# Patient Record
Sex: FEMALE | Race: WHITE
[De-identification: names, ages, dates, MRNs, and addresses within clinical notes are randomized per-mention and may not be internally consistent; named-entity substitution may affect disease eponyms.]

---

## 2017-02-27 ENCOUNTER — HOSPITAL ENCOUNTER (EMERGENCY)
Dept: HOSPITAL 80 - CED | Age: 52
Discharge: HOME | End: 2017-02-27
Payer: COMMERCIAL

## 2017-02-27 VITALS
OXYGEN SATURATION: 97 % | DIASTOLIC BLOOD PRESSURE: 89 MMHG | SYSTOLIC BLOOD PRESSURE: 130 MMHG | RESPIRATION RATE: 16 BRPM | HEART RATE: 79 BPM

## 2017-02-27 DIAGNOSIS — G35: ICD-10-CM

## 2017-02-27 DIAGNOSIS — R07.89: Primary | ICD-10-CM

## 2017-02-27 LAB
% IMMATURE GRANULYOCYTES: 0.2 % (ref 0–1.1)
ABSOLUTE IMMATURE GRANULOCYTES: 0.01 10^3/UL (ref 0–0.1)
ABSOLUTE NRBC COUNT: 0 10^3/UL (ref 0–0.01)
ADD DIFF?: NO
ADD MORPH?: NO
ADD SCAN?: NO
ANION GAP SERPL CALC-SCNC: 12 MEQ/L (ref 8–16)
APTT BLD: 26.4 SEC (ref 23–38)
ATYPICAL LYMPHOCYTE FLAG: 0 (ref 0–99)
CALCIUM SERPL-MCNC: 9.7 MG/DL (ref 8.5–10.4)
CHLORIDE SERPL-SCNC: 101 MEQ/L (ref 97–110)
CO2 SERPL-SCNC: 26 MEQ/L (ref 22–31)
CREAT SERPL-MCNC: 0.8 MG/DL (ref 0.6–1)
ERYTHROCYTE [DISTWIDTH] IN BLOOD BY AUTOMATED COUNT: 11.9 % (ref 11.5–15.2)
FRAGMENT RBC FLAG: 0 (ref 0–99)
GFR SERPL CREATININE-BSD FRML MDRD: > 60 ML/MIN/{1.73_M2}
GLUCOSE SERPL-MCNC: 94 MG/DL (ref 70–100)
HCT VFR BLD CALC: 43.7 % (ref 38–47)
HGB BLD-MCNC: 15.1 G/DL (ref 12.6–16.3)
INR PPP: 0.97 (ref 0.83–1.16)
LEFT SHIFT FLG: 0 (ref 0–99)
LIPEMIA HEMOLYSIS FLAG: 90 (ref 0–99)
MCH RBC BLDCO QN: 31.8 PG (ref 27.9–34.1)
MCHC RBC AUTO-ENTMCNC: 34.6 G/DL (ref 32.4–36.7)
MCV RBC AUTO: 92 FL (ref 81.5–99.8)
NRBC-AUTO%: 0 % (ref 0–0.2)
PLATELET # BLD: 183 10^3/UL (ref 150–400)
PLATELET CLUMPS FLAG: 0 (ref 0–99)
PMV BLD AUTO: 10.5 FL (ref 8.7–11.7)
POTASSIUM SERPL-SCNC: 4 MEQ/L (ref 3.5–5.2)
PROTHROMBIN TIME: 12.6 SEC (ref 12–15)
RBC # BLD AUTO: 4.75 10^6/UL (ref 4.18–5.33)
SODIUM SERPL-SCNC: 139 MEQ/L (ref 134–144)
TROPONIN I SERPL-MCNC: < 0.012 NG/ML (ref 0–0.03)

## 2017-02-27 NOTE — UCPHY
H & P


Time Seen by Provider: 02/27/17 19:01


Patient Type: Established


HPI/ROS: 





HPI


Chest discomfort.





51-year-old female by private vehicle with her .  She reports that she 

has had on and off chest discomfort which she describes as a tightness, 

pressure and mild aching sensation in the mid chest with radiation to the back.

  She reports onset of this was yesterday.  It is not worse with exertion or at 

rest.  She reports that comes on intermittently.  She denies any significant 

shortness of breath.  No cough.  No fever.  She has no family history of 

coronary artery disease.  She has a history of MS.  She is on some medications 

for this.  Otherwise no history of hypertension, diabetes, hyperlipidemia.  She 

is a nonsmoker.





ROS:





Constitutional:  No fever, no chills.  No weakness.


Eyes:  No discharge.  No changes in vision.


ENT:  No sore throat.  No nasal congestion or rhinorrhea.


Respiratory:  No cough.  No shortness of breath.


Cardiac:  As above, no palpitations.


Gastrointestinal:  No abdominal pain, no vomiting, no diarrhea.


Genitourinary:  No hematuria.  No dysuria or increased frequency with urination.


Musculoskeletal:  No back pain.  No neck pain.  No myalgias or arthralgias.


Skin:  No rashes.


Neurological:  No headache.  No focal weakness or altered sensation.





Past medical history:  As above.





Social history:  Here with her .  Nonsmoker.





Physical Exam:





General Appearance:  Alert, no distress.  This patient is responding to 

questions appropriately and in full sentences.  This patient appears well-

hydrated and well-nourished.


Eyes:  Pupils equal and round no pallor or injection.  No lid edema, erythema 

or injection.


Respiratory:  There are no retractions, lungs are clear to auscultation with 

good air movement bilaterally.


Cardiovascular:  Regular rate and rhythm.  No murmur.  No tenderness on 

palpation of the chest wall.


Gastrointestinal:  Abdomen is soft and nontender, no masses, bowel sounds 

normal.  No focal tenderness at McBurney's point.  No Rojas sign.


Neurological:  Motor sensory function is grossly intact.  Cranial nerves are 

normal.  Gait is normal.


Skin:  Warm and dry, no rashes.


Musculoskeletal:  Neck is supple and nontender.


Extremities are symmetrical.  All joints range without pain or impingement.


Psychiatric:  No agitation.  No depression.





Database:





EKG:





EKG time is 7:13 p.m.; EKG shows a narrow complex normal sinus rhythm with a 

ventricular rate of 82.  The OK, QRS, QT intervals are within normal limits.  

There are no ST-T wave changes indicative of ischemic or injury pattern.  No 

evidence of right heart strain.  Interpreted by me.





Imaging:





Chest x-ray PA and lateral; the cardiac mediastinal silhouette is unremarkable.

  No evidence of infiltrate or pneumothorax.  No acute cardiopulmonary disease 

process noted.  Interpreted by me.





Procedures:





Emergency department course:





IV placed.  She was placed on a monitor.  EKG and chest x-ray performed.  She 

was given 324 mg of chewed aspirin.  She was given 0.5 mg of IV Ativan.





8:10 p.m., patient re-evaluated.  She is resting comfortably at this time.  She 

denies any complaints at this time.  No chest discomfort.  No shortness of 

breath. Her Urgent Care evaluation is reassuring.  Her presentation is not 

consistent with acute coronary syndrome or pulmonary embolism.  She thinks that 

the Ativan helped her.  Results of her diagnostic workup were discussed with 

her and her .  I discussed admission.  She does not want to be admitted.

  She is requesting discharge.  She feels comfortable going home.  I will have 

her follow up with [facility] for stress testing and re-evaluation within 

the next 3 days.  Return to emergency department precautions of thoroughly been 

reviewed with her.  All of her questions were answered.  She was discharged in 

good condition.





Differential Diagnosis:





The differential diagnosis on this patient includes but is not limited to 

anxiety reaction, stress reaction, esophageal spasm, pleurisy, reactive airway 

disease.  Acute coronary syndrome, pulmonary embolism, pericarditis, myocarditis

, pneumonia, pneumothorax, AAA unlikely.  This represents a partial list of 

diagnoses considered.  These considerations are based on history, physical exam

, past history, reassessment and diagnostic testing.


Constitutional: 


 Initial Vital Signs











Heart Rate  79   02/27/17 20:50


 


Respiratory Rate  16   02/27/17 20:50


 


Blood Pressure  130/89 H  02/27/17 20:50


 


O2 Sat (%)  97   02/27/17 20:50








 











O2 Delivery Mode               Room Air














Allergies/Adverse Reactions: 


 





No Known Allergies Allergy (Unverified 08/23/12 14:05)


 








Home Medications: 














 Medication  Instructions  Recorded


 


Estrogen,Con/M-Progest Acet  02/27/17














Medical Decision Making





- Data Points


Laboratory Results: 


 Laboratory Results





 02/27/17 19:21 





 02/27/17 19:21 








Medications Given: 


 








Discontinued Medications





Aspirin (Aspirin)  324 mg PO EDNOW ONE


   Stop: 02/27/17 19:28


   Last Admin: 02/27/17 20:00 Dose:  324 mg


Sodium Chloride (Ns)  500 mls @ 0 mls/hr IV ONCE ONE


   PRN Reason: As Directed


   Stop: 02/27/17 19:28


   Last Admin: 02/27/17 19:58 Dose:  500 mls


Lorazepam (Ativan Injection)  0.5 mg IVP EDNOW ONE


   Stop: 02/27/17 19:28


   Last Admin: 02/27/17 20:01 Dose:  0.5 mg


Lorazepam (Ativan 1 Mg Prepack#4)  1 btl TAKEHOME EDNOW ONE


   Stop: 02/27/17 20:19


   Last Admin: 02/27/17 20:50 Dose:  1 btl








Departure





- Departure


Disposition: Home, Routine, Self-Care


Clinical Impression: 


 Chest discomfort





Condition: Good


Instructions:  Chest Pain (ED)


Additional Instructions: 


Read and follow provided instructions.





Ativan, 1 mg tablets:  1 tablet at night to help you sleep and relieve anxiety.





Follow-up with with Dr. Jordan Avery or 1 of his partners were assistance at 

[facility] for a stress test as discussed within the next 2-3 days.





Return to the emergency department immediately for worsening chest pain, 

shortness of breath or other serious concerns.


Referrals: 


Blanca Manzanares MD [Primary Care Provider] - As per Instructions


Jordan Avery MD [Medical Doctor] - As per Instructions





- PQRS


PQRS Measurement: 





Not applicable.

## 2017-02-27 NOTE — CPEKG
Heart Rate: 82

RR Interval: 732

P-R Interval: 156

QRSD Interval: 78

QT Interval: 376

QTC Interval: 439

P Axis: 71

QRS Axis: 56

T Wave Axis: 41

EKG Severity - BORDERLINE ECG -

EKG Impression: SINUS RHYTHM

EKG Impression: PROBABLE LEFT ATRIAL ABNORMALITY

Electronically Signed By: McCollester, Laughlin 27-Feb-2017 20:44:42

## 2017-04-06 ENCOUNTER — HOSPITAL ENCOUNTER (EMERGENCY)
Dept: HOSPITAL 80 - CED | Age: 52
Discharge: HOME | End: 2017-04-06
Payer: COMMERCIAL

## 2017-04-06 VITALS
RESPIRATION RATE: 18 BRPM | SYSTOLIC BLOOD PRESSURE: 133 MMHG | HEART RATE: 90 BPM | OXYGEN SATURATION: 98 % | DIASTOLIC BLOOD PRESSURE: 93 MMHG | TEMPERATURE: 97.9 F
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
## 2017-04-06 DIAGNOSIS — S50.912A: ICD-10-CM

## 2017-04-06 DIAGNOSIS — Z23: ICD-10-CM
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
## 2017-04-06 DIAGNOSIS — S61.230A: Primary | ICD-10-CM
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
## 2017-04-06 NOTE — UCPHY
H & P


Time Seen by Provider: 04/06/17 13:21


Patient Type: New


HPI/ROS: 





CHIEF COMPLAINT:  Cat bite/scratch





HPI: The patient is a 51-year-old healthy female who was bitten and scratched 

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 

ago.  She describes a bite to the right index finger as well as numerous 

scratches all over her left forearm and hand.  She cleaned the wound prior to 

arrival.  Her last tetanus shot is unknown.  She currently does not know the 

vaccination status of the cat but is attempting to find out.





REVIEW OF SYSTEMS:


Aside from elements discussed in the HPI, a comprehensive 10-point review of 

systems was reviewed and is negative.





PMH:  None significant.





SOCIAL HISTORY:  Works as an .





FAMILY HISTORY: Reviewed, noncontributory





PHYSICAL EXAM:


General:Patient is alert, in no acute distress.


Extremities:  A laceration/puncture wounds present on the right index finger 

measuring approximately 0.5 cm in length.  Numerous superficial scratches are 

present on the left forearm and hand.  No apparent joint involvement.  No 

apparent foreign body.  Distal capillary refill intact.


Neuro: Oriented x3.  Normal motor function.  Normal sensory function.


Smoking Status: Never smoked


Constitutional: 


 Initial Vital Signs











Temperature (C)  36.6 C   04/06/17 13:27


 


Heart Rate  90   04/06/17 13:27


 


Respiratory Rate  18   04/06/17 13:27


 


Blood Pressure  133/93 H  04/06/17 13:27


 


O2 Sat (%)  98   04/06/17 13:27








 











O2 Delivery Mode               Room Air














Allergies/Adverse Reactions: 


 





No Known Allergies Allergy (Verified 04/06/17 13:26)


 








Home Medications: 














 Medication  Instructions  Recorded


 


Estrogen,Con/M-Progest Acet  02/27/17


 


Amoxicillin/Clavulanate Pot 875 mg PO BID #14 tab 04/06/17





[Augmentin 875Mg]  


 


Aubagio  04/06/17


 


Wellbutrin 100mg (*)  04/06/17














MDM/Departure





- MDM


ED Course/Re-evaluation: 





This patient presents with numerous soft tissue injuries from a cat attack.  We 

will place her on Augmentin as prophylaxis and provide wound care here in the 

urgent care.  We discussed return precautions and warning signs of infection.





- Depart


Disposition: Home, Routine, Self-Care


Clinical Impression: 


 Cat bite involving extremity, Cat scratch





Condition: Good


Instructions:  Animal Bite (ED)


Additional Instructions: 


Keep wounds kary, dry, and cover with neosporin or similar antibiotic ointment. 

Return to the Emergency Department for fever, redness, discharge from wound, 

increasing pain or other worsening of condition.


Prescriptions: 


Amoxicillin/Clavulanate Pot [Augmentin 875Mg] 875 mg PO BID #14 tab


Referrals: 


NONE *PRIMARY CARE P,. [Primary Care Provider] - As per Instructions





- PQRS


PQRS Measurement: 





134:   Depression screening and followup, PRIME MD-PHQ2  (12 years and older)


Over the last 2 weeks, how often have you been bothered by any of the following 

problems? 


 1. Feeling down, depressed, or hopeless?


2. Little interest or pleasure in doing things? 


Patient answered no to both 1 and 2








130:  Documentation of medications.  


Reviewed all patient medications, doses, route and frequency.








226:  Do you smoke?


No.





51:  18 years old and older with diagnosis of COPD, spirometry performance.


Spirometry not performed; equipment not available.


Patient has no history of COPD





52:  18 years old and older with COPD and symptoms of COPD or FEV1<60% 

predicted prescribed a B Agonist.


Spirometry not performed; equipment not available.

## 2017-08-24 ENCOUNTER — HOSPITAL ENCOUNTER (EMERGENCY)
Dept: HOSPITAL 80 - CED | Age: 52
Discharge: HOME | End: 2017-08-24
Payer: COMMERCIAL

## 2017-08-24 VITALS
RESPIRATION RATE: 18 BRPM | HEART RATE: 90 BPM | TEMPERATURE: 98.4 F | SYSTOLIC BLOOD PRESSURE: 151 MMHG | DIASTOLIC BLOOD PRESSURE: 92 MMHG | OXYGEN SATURATION: 95 %
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
## 2017-08-24 DIAGNOSIS — S52.121A: Primary | ICD-10-CM
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
## 2017-08-24 PROCEDURE — A4565 SLINGS: HCPCS

## 2017-08-24 NOTE — EDPHY
H & P


Stated Complaint: MECHANICAL FALL WHILE AT WORK YESTERDAY, INJURY TO RIGHT ELBOW

, +CMS


Time Seen by Provider: 08/24/17 15:52


HPI/ROS: 





Chief Complaint:  Right elbow injury





HPI:  51-year-old female had a clinical follow-up work 2 days ago when she was 

pulled by a dog and landed on her right elbow.  She has had pain in the lateral 

aspect of right elbow since.  No decreased range of motion.  She has a history 

of a prior fracture her in surgical repair in that elbow in the past.  Denies 

any new numbness or tingling.  Denies other injuries.





ROS:  10 point Review of Systems is negative except as noted in the HPI.





Family History: [non-contributory]





Physical Exam:


General:  Awake, alert, no acute distress


Right arm.  Right shoulder is nontender, full range of motion without pain.


Right elbow.  There is mild tenderness over her radial head.  Mild pain with 

flexion and extension.  Mild pain with pronation and supination all in the 

lateral aspect of her right elbow.  No deformity noted.  There is moderate 

ecchymosis.


Right wrist:  Nontender full range without pain.


Right hand:  Sensations intact in the radial, median and ulnar nerve 

distribution.  Capillary refills less than 2 seconds


Skin:  No rash





- Personal History


LMP (Females 10-55): Post Menopausal





- Medical/Surgical History


Other PMH: MS, Depression/anxiety, R ELBOW SURGERY





- Social History


Smoking Status: Never smoked


Constitutional: 





 Initial Vital Signs











Temperature (C)  36.9 C   08/24/17 15:55


 


Heart Rate  90   08/24/17 15:55


 


Respiratory Rate  18   08/24/17 15:55


 


Blood Pressure  151/92 H  08/24/17 15:55


 


O2 Sat (%)  95   08/24/17 15:55








 











O2 Delivery Mode               Room Air














Allergies/Adverse Reactions: 


 





No Known Allergies Allergy (Verified 04/06/17 13:26)


 








Home Medications: 














 Medication  Instructions  Recorded


 


Estrogen,Con/M-Progest Acet  02/27/17


 


Amoxicillin/Clavulanate Pot 875 mg PO BID #14 tab 04/06/17





[Augmentin 875Mg]  


 


Aubagio  04/06/17


 


Wellbutrin 100mg (*)  04/06/17














Medical Decision Making





- Diagnostics


Imaging Results: 





 Imaging Impressions





Elbow X-Ray  08/24/17 15:55


Impression: Minimally displaced radial head fracture.


 


 











Imaging: I viewed and interpreted images myself


ED Course/Re-evaluation: 





Right elbow x-ray reveals a minimally displaced radial head fracture.  Patient 

has been placed in a long-arm splint at 90.  She is referred to Orthopedics 

for outpatient follow-up is.





Departure





- Departure


Disposition: Home, Routine, Self-Care


Clinical Impression: 


 Radial head fracture





Condition: Good


Instructions:  Elbow Fracture (ED), Splint Care (ED)


Additional Instructions: 


Follow up with Orthopedics in 3-4 days.


Keep your arm in the splint until it is removed by the orthopedist.


You may alternate ibuprofen with acetaminophen every 4 hours as needed for pain.


Apply ice for 15-20 minutes 4 times a day.


Return for increasing pain, numbness, tingling, worsening swelling, or any 

other concerns.





Referrals: 


Pedro Patel MD [Medical Doctor] - As per Instructions

## 2018-08-14 ENCOUNTER — HOSPITAL ENCOUNTER (OUTPATIENT)
Age: 53
End: 2018-08-14
Payer: COMMERCIAL

## 2018-08-14 DIAGNOSIS — Z12.31: Primary | ICD-10-CM
